# Patient Record
Sex: FEMALE | Race: WHITE
[De-identification: names, ages, dates, MRNs, and addresses within clinical notes are randomized per-mention and may not be internally consistent; named-entity substitution may affect disease eponyms.]

---

## 2018-09-23 ENCOUNTER — HOSPITAL ENCOUNTER (EMERGENCY)
Dept: HOSPITAL 97 - ER | Age: 58
Discharge: HOME | End: 2018-09-23
Payer: COMMERCIAL

## 2018-09-23 DIAGNOSIS — J01.90: Primary | ICD-10-CM

## 2018-09-23 DIAGNOSIS — Z88.7: ICD-10-CM

## 2018-09-23 PROCEDURE — 87081 CULTURE SCREEN ONLY: CPT

## 2018-09-23 PROCEDURE — 99283 EMERGENCY DEPT VISIT LOW MDM: CPT

## 2018-09-23 PROCEDURE — 87804 INFLUENZA ASSAY W/OPTIC: CPT

## 2018-09-23 PROCEDURE — 71046 X-RAY EXAM CHEST 2 VIEWS: CPT

## 2018-09-23 PROCEDURE — 87070 CULTURE OTHR SPECIMN AEROBIC: CPT

## 2018-09-23 NOTE — EDPHYS
Physician Documentation                                                                           

 [facility]                                                                

Name: Cornelia Waddell                                                                                

Age: 57 yrs                                                                                       

Sex: Female                                                                                       

: 1960                                                                                   

MRN: [id number]                                                                                   

Arrival Date: 2018                                                                          

Time: 12:18                                                                                       

Account#: [id number]                                                                            

Bed 23                                                                                            

Private MD: None, None                                                                            

ED Physician Sg Lyn                                                                      

HPI:                                                                                              

                                                                                             

13:54 This 57 yrs old  Female presents to ER via Ambulatory with complaints of       pm1 

      Productive Cough, Chest Congestion, Fever, No Voice.                                        

13:54 The patient or guardian reports cough, flu symptoms, low-grade fever, hoarse voice.     pm1 

      Onset: The symptoms/episode began/occurred 1 week(s) ago. Severity of symptoms: in the      

      emergency department the symptoms are actually worse. Modifying factors: The symptoms       

      are alleviated by nothing, the symptoms are aggravated by mold at work. Associated          

      signs and symptoms: Pertinent positives: sore throat, diarrhea from weight loss oil she     

      is taking, Pertinent negatives: nausea, vomiting. The patient has experienced similar       

      episodes in the past, a few times. The patient has not recently seen a physician.           

      Patient is a teacher, first grade, with multiple students with same symptoms .              

                                                                                                  

OB/GYN:                                                                                           

14:36 LMP N/A - Hysterectomy                                                                  kr2 

                                                                                                  

Historical:                                                                                       

- Allergies:                                                                                      

12:21 Tetanus-Diphtheria Toxoids-Td;                                                          la1 

- PMHx:                                                                                           

12:21 Arthritis; Hypothyroidism;                                                              la1 

- PSHx:                                                                                           

12:22 Cholecystectomy; Gastric Bypass; mesh sling; Hysterectomy;                              la1 

                                                                                                  

- Immunization history:: Adult Immunizations up to date.                                          

- Social history:: Smoking status: Patient/guardian denies using tobacco.                         

- Ebola Screening: : No symptoms or risks identified at this time.                                

                                                                                                  

                                                                                                  

ROS:                                                                                              

13:54 Eyes: Negative for injury, pain, redness, and discharge.                                pm1 

13:54 Neck: Negative for injury, pain, and swelling, Cardiovascular: Negative for chest pain,     

      palpitations, and edema.                                                                    

13:54 Abdomen/GI: Negative for abdominal pain, nausea, vomiting, diarrhea, and constipation,      

      Back: Negative for injury and pain, : Negative for injury, bleeding, discharge, and       

      swelling, MS/Extremity: Negative for injury and deformity, Skin: Negative for injury,       

      rash, and discoloration, Neuro: Negative for headache, weakness, numbness, tingling,        

      and seizure.                                                                                

13:54 Constitutional: Positive for body aches, fever, Negative for poor PO intake.                

13:54 ENT: Positive for sinus congestion, sore throat, Post nasal drainage, Negative for          

      drainage from ear(s), ear pain.                                                             

13:54 Respiratory: Positive for cough, Negative for shortness of breath, wheezing.                

                                                                                                  

Exam:                                                                                             

13:54 Constitutional:  This is a well developed, well nourished patient who is awake, alert,  pm1 

      and in no acute distress. Eyes:  Pupils equal round and reactive to light, extra-ocular     

      motions intact.  Lids and lashes normal.  Conjunctiva and sclera are non-icteric and        

      not injected.  Cornea within normal limits.  Periorbital areas with no swelling,            

      redness, or edema. ENT:  Nares patent. No nasal discharge, no septal abnormalities          

      noted.  Tympanic membranes are normal and external auditory canals are clear.               

      Oropharynx with no redness, swelling, or masses, exudates, or evidence of obstruction,      

      uvula midline.  Mucous membranes moist. Neck:  Trachea midline, no thyromegaly or           

      masses palpated, and no cervical lymphadenopathy.  Supple, full range of motion without     

      nuchal rigidity, or vertebral point tenderness.  No Meningismus. Chest/axilla:  Normal      

      chest wall appearance and motion.  Nontender with no deformity.  No lesions are             

      appreciated. Cardiovascular:  Regular rate and rhythm with a normal S1 and S2.  No          

      gallops, murmurs, or rubs.  Normal PMI, no JVD.  No pulse deficits. Respiratory:  Lungs     

      have equal breath sounds bilaterally, clear to auscultation and percussion.  No rales,      

      rhonchi or wheezes noted.  No increased work of breathing, no retractions or nasal          

      flaring. Abdomen/GI:  Soft, non-tender, with normal bowel sounds.  No distension or         

      tympany.  No guarding or rebound.  No evidence of tenderness throughout.                    

13:54 Back:  No spinal tenderness.  No costovertebral tenderness.  Full range of motion.          

      Skin:  Warm, dry with normal turgor.  Normal color with no rashes, no lesions, and no       

      evidence of cellulitis. MS/ Extremity:  Pulses equal, no cyanosis.  Neurovascular           

      intact.  Full, normal range of motion.                                                      

13:54 Head/face: Sinus tenderness, is located over the  right frontal sinus and left frontal      

      sinus.                                                                                      

13:54 Neuro: Orientation: is normal, Motor: moves all fours.                                      

                                                                                                  

Vital Signs:                                                                                      

12:21  / 81; Pulse 87; Resp 16; Temp 98.0; Pulse Ox 99% on R/A; Weight 86.18 kg; Height la1 

      5 ft. 9 in. (175.26 cm);                                                                    

14:31  / 82; Pulse 88; Resp 16; Pulse Ox 99% on R/A;                                    kr2 

12:21 Body Mass Index 28.06 (86.18 kg, 175.26 cm)                                             la1 

                                                                                                  

MDM:                                                                                              

13:04 Patient medically screened.                                                             pm1 

14:08 Data reviewed: vital signs. Data interpreted: Pulse oximetry: on room air is 99 %.      pm1 

      Interpretation: normal. Counseling: I had a detailed discussion with the patient and/or     

      guardian regarding: the historical points, exam findings, and any diagnostic results        

      supporting the discharge/admit diagnosis, lab results, radiology results, the need for      

      outpatient follow up, to return to the emergency department if symptoms worsen or           

      persist or if there are any questions or concerns that arise at home.                       

                                                                                                  

                                                                                             

13:16 Order name: Flu; Complete Time: 17:02                                                   pm1 

                                                                                             

13:16 Order name: Strep; Complete Time: 17:02                                                 pm1 

                                                                                             

13:16 Order name: Chest Pa And Lat (2 Views) XRAY; Complete Time: 13:54                       pm1 

                                                                                             

13:55 Order name: Throat Culture                                                              EDMS

                                                                                                  

Administered Medications:                                                                         

No medications were administered                                                                  

                                                                                                  

                                                                                                  

Disposition:                                                                                      

                                                                                             

07:06 Co-signature as Attending Physician, Sg Lyn MD I agree with the assessment and  linda 

      plan of care.                                                                               

                                                                                                  

Disposition:                                                                                      

18 14:09 Discharged to Home. Impression: Acute sinusitis.                                   

- Condition is Stable.                                                                            

- Discharge Instructions: Sinusitis, Adult.                                                       

- Prescriptions for Amoxicillin 500 mg Oral Capsule - take 1 capsule by ORAL route                

  every 8 hours for 10 days; 30 tablet. Zyrtec- D 5-120 mg Oral Tablet Sustained                  

  Release 12 hr - take 1 tablet by ORAL route every 12 hours As needed; 20 tablet.                

- Medication Reconciliation Form, Thank You Letter, Antibiotic Education, Prescription            

  Opioid Use form.                                                                                

- Follow up: Emergency Department; When: As needed; Reason: Worsening of condition.               

  Follow up: Private Physician; When: 2 - 3 days; Reason: Recheck today's complaints,             

  Continuance of care, Re-evaluation by your physician.                                           

- Problem is new.                                                                                 

- Symptoms have improved.                                                                         

                                                                                                  

                                                                                                  

                                                                                                  

Signatures:                                                                                       

Dispatcher [employer]                           EDSg Dobbs MD                     MD   linda                                                  

Attema, Kvng, RN                         RN   la1                                                  

Silvestre Son, NP                    NP   pm1                                                  

Lidia Ku, RN                       RN   kr2                                                  

                                                                                                  

Corrections: (The following items were deleted from the chart)                                    

                                                                                             

14:23 14:09 2018 14:09 Discharged to Home. Impression: Bronchitis, not specified as     pm1 

      acute or chronic. Condition is Stable. Forms are Medication Reconciliation Form, Thank      

      You Letter, Antibiotic Education, Prescription Opioid Use. Follow up: Emergency             

      Department; When: As needed; Reason: Worsening of condition. Follow up: Private             

      Physician; When: 2 - 3 days; Reason: Recheck today's complaints, Continuance of care,       

      Re-evaluation by your physician. Problem is new. Symptoms have improved. pm1                

14:35 13:18 Urine Dipstick-Ancillary ordered. pm1                                             kr2 

14:35 13:18 Urine Pregnancy Test ordered. pm1                                                 kr2 

14:36 14:23 2018 14:09 Discharged to Home. Impression: Acute sinusitis. Condition is    kr2 

      Stable. Discharge Instructions: Acute Bronchitis, Adult. Prescriptions for Zithromax        

      Z-Chato 250 mg Oral Tablet - take 1 tablet by ORAL route as directed for 5 days Day 1 -       

      take two (2) tablets one time. Day 2, 3, 4 , 5 take one (1) tablet once daily.; 6           

      tablet. and Forms are Medication Reconciliation Form, Thank You Letter, Antibiotic          

      Education, Prescription Opioid Use. Follow up: Emergency Department; When: As needed;       

      Reason: Worsening of condition. Follow up: Private Physician; When: 2 - 3 days; Reason:     

      Recheck today's complaints, Continuance of care, Re-evaluation by your physician.           

      Problem is new. Symptoms have improved. pm1                                                 

                                                                                                  

**************************************************************************************************

## 2018-09-23 NOTE — ER
Nurse's Notes                                                                                     

 [facility]                                                                

Name: Cornelia Waddell                                                                                

Age: 57 yrs                                                                                       

Sex: Female                                                                                       

: 1960                                                                                   

MRN: [id number]                                                                                   

Arrival Date: 2018                                                                          

Time: 12:18                                                                                       

Account#: [id number]                                                                            

Bed 23                                                                                            

Private MD: None, None                                                                            

Diagnosis: Acute sinusitis                                                                        

                                                                                                  

Presentation:                                                                                     

                                                                                             

12:20 Presenting complaint: Patient states: Productive cough for 3 days and I am losing my    la1 

      voice. Transition of care: patient was not received from another setting of care. Onset     

      of symptoms was 2018. Risk Assessment: Do you want to hurt yourself or        

      someone else? Patient reports no desire to harm self or others. Initial Sepsis Screen:      

      Does the patient meet any 2 criteria? No. Patient's initial sepsis screen is negative.      

      Does the patient have a suspected source of infection? No. Patient's initial sepsis         

      screen is negative. Care prior to arrival: None.                                            

12:20 Method Of Arrival: Ambulatory                                                           la1 

12:20 Acuity: SANG 3                                                                           la1 

                                                                                                  

Triage Assessment:                                                                                

13:49 General: Appears in no apparent distress. Respiratory: Onset: The symptoms/episode      kr2 

      began/occurred gradually, the patient has mild shortness of breath. Respiratory: Breath     

      sounds are clear bilaterally. Respiratory: Airway is patent Respiratory effort is even,     

      unlabored, Respiratory pattern is regular, symmetrical. Respiratory: Reports cough that     

      is productive, Onset: The symptoms/episode began/occurred.                                  

                                                                                                  

OB/GYN:                                                                                           

14:36 LMP N/A - Hysterectomy                                                                  kr2 

                                                                                                  

Historical:                                                                                       

- Allergies:                                                                                      

12:21 Tetanus-Diphtheria Toxoids-Td;                                                          la1 

- PMHx:                                                                                           

12:21 Arthritis; Hypothyroidism;                                                              la1 

- PSHx:                                                                                           

12:22 Cholecystectomy; Gastric Bypass; mesh sling; Hysterectomy;                              la1 

                                                                                                  

- Immunization history:: Adult Immunizations up to date.                                          

- Social history:: Smoking status: Patient/guardian denies using tobacco.                         

- Ebola Screening: : No symptoms or risks identified at this time.                                

                                                                                                  

                                                                                                  

Screenin:05 Abuse screen: Denies threats or abuse. Denies injuries from another. Nutritional        kr2 

      screening: No deficits noted. Tuberculosis screening: No symptoms or risk factors           

      identified. Fall Risk None identified.                                                      

                                                                                                  

Assessment:                                                                                       

13:05 General: Appears in no apparent distress. uncomfortable, well groomed, well developed,  kr2 

      well nourished, Behavior is calm, cooperative, appropriate for age. Pain: Complains of      

      pain in head, face, throat Pain does not radiate. Pain currently is 3 out of 10 on a        

      pain scale. Quality of pain is described as aching, tender, Is continuous, Alleviated       

      by nothing. Aggravated by eating, drinking. Neuro: Level of Consciousness is awake,         

      alert, obeys commands, Oriented to person, place, time, situation, Appropriate for age.     

      Cardiovascular: Rhythm is regular. Respiratory: Reports cough that is productive,           

      persistent Airway is patent Respiratory effort is even, unlabored, Breath sounds are        

      clear bilaterally. GI: Abdomen is flat, non-distended. EENT: Oral mucosa is moist.          

      Throat is pink. Derm: Skin is intact, is healthy with good turgor, Skin is pink, warm \T\   

      dry.                                                                                        

14:31 Reassessment: Patient appears in no apparent distress at this time. Patient and/or      kr2 

      family updated on plan of care and expected duration. Pain level reassessed. Patient is     

      alert, oriented x 3, equal unlabored respirations, skin warm/dry/pink.                      

                                                                                                  

Vital Signs:                                                                                      

12:21  / 81; Pulse 87; Resp 16; Temp 98.0; Pulse Ox 99% on R/A; Weight 86.18 kg; Height la1 

      5 ft. 9 in. (175.26 cm);                                                                    

14:31  / 82; Pulse 88; Resp 16; Pulse Ox 99% on R/A;                                    kr2 

12:21 Body Mass Index 28.06 (86.18 kg, 175.26 cm)                                             la1 

                                                                                                  

ED Course:                                                                                        

12:18 Patient arrived in ED.                                                                  sb2 

12:18 None, None is Private Physician.                                                        sb2 

12:21 Triage completed.                                                                       la1 

12:21 Arm band placed on right wrist.                                                         la1 

13:04 Silvestre Son NP is PHCP.                                                           pm1 

13:04 Sg Lyn MD is Attending Physician.                                             pm1 

13:05 Lidia Ku, IMER is Primary Nurse.                                                     kr2 

13:05 Patient has correct armband on for positive identification. Bed in low position. Call   kr2 

      light in reach. Side rails up X 1. Pulse ox on. NIBP on. Door closed. Warm blanket          

      given. Head of bed elevated.                                                                

13:28 Chest Pa And Lat (2 Views) XRAY In Process Unspecified.                                 EDMS

14:35 No provider procedures requiring assistance completed. Patient did not have IV access   kr2 

      during this emergency room visit.                                                           

                                                                                                  

Administered Medications:                                                                         

No medications were administered                                                                  

                                                                                                  

                                                                                                  

Outcome:                                                                                          

14:09 Discharge ordered by MD.                                                                pm1 

14:35 Discharged to home ambulatory.                                                          kr2 

14:35 Condition: good                                                                             

14:35 Discharge instructions given to patient, family, Instructed on discharge instructions,      

      follow up and referral plans. medication usage, Demonstrated understanding of               

      instructions, follow-up care, medications, Prescriptions given X 1.                         

14:36 Patient left the ED.                                                                    kr2 

                                                                                                  

Signatures:                                                                                       

Dispatcher [employer]                           EDMS                                                 

Kvng Baxter RN                         RN   la1                                                  

Silvestre Son, ANTONIO                    NP   pm1                                                  

Lidia Ku RN                       RN   kr2                                                  

Christin Anton                               sb2                                                  

                                                                                                  

**************************************************************************************************

## 2018-09-24 NOTE — XMS REPORT
Clinical Summary

 Created on:2018



Patient:Cornelia Chang

Sex:Female

:1960

External Reference #:[id number]





Demographics







 Address  [address]



   [place] [address]

 

 Mobile Phone  [phone]

 

 Home Phone  [phone]

 

 Work Phone  [phone]

 

 Email Address  [email or web address]

 

 Preferred Language  English

 

 Marital Status  

 

 Mosque Affiliation  Unknown

 

 Race  White

 

 Ethnic Group  Not  or 









Author







 Organization  [facility]

 

 Address  [address] [place] [address]









Support







 Name  Relationship  Address  Phone

 

 Yoni Chang  Spouse  [address]  [phone]



     [place] [address]  









Care Team Providers







 Name  Role  Phone

 

 Ban Mcelroy MD  Primary Care Provider  [phone]









Allergies

No Known Allergies



Current Medications







 Prescription  Sig.  Disp.  Refills  Start Date  End Date  Status

 

 LEVOXYL 50 mcg tablet  Take 50 mcg by    2  02/10/2017    Active



   mouth once daily.          

 

 ferrous sulfate 325 (65  Take 1 tablet by    3  2017    Active



 FE) MG tablet  mouth 2 (two) times          



   a day.          

 

 QSYMIA 7.5-46 mg capsule,  Take 1 capsule by    4  2017    Active



 ER multiphase 24 hr  mouth every          



   morning.          

 

 docusate sodium (COLACE)  Take 100 mg by    3  2017    Active



 100 MG capsule  mouth 2 (two) times          



   a day.          







Active Problems

Not on file



Family History







 Relation  Name  Status  Comments

 

 Father      

 

 Mother      

 

 Other  siblings  Alive  

 

 Other  sibling    







Social History







 Tobacco Use  Types  Packs/Day  Years Used  Date

 

 Never Smoker        









 Smokeless Tobacco: Never Used      









 Alcohol Use  Drinks/Week  oz/Week  Comments

 

 No      









 Sex Assigned at Birth  Date Recorded

 

 Not on file  







Last Filed Vital Signs

Not on file



Plan of Treatment







 Health Maintenance  Due Date  Last Done  Comments

 

 CERVICAL CANCER SCREENING  10/16/1983    

 

 BREAST CANCER SCREENING  10/16/2012    

 

 COLON CANCER SCREENING  10/16/2012    

 

 SHINGRIX VACCINE (#1)  10/16/2012    

 

 INFLUENZA VACCINE  2018    







Results

Not on fileafter 2017



Insurance







 Payer  Benefit Plan / Group  Subscriber ID  Type  Phone  Address

 

 [employer]  [employer]WORK  xxxxxxxxxxx  HMO    









 Guarantor Name  Account Type  Relation to  Date of Birth  Phone  Billing



     Patient      Address

 

 CORNELIA CHANG  Personal/Family  Self  1960  Work:  [address]



         [phone]  [place] [address]







         [address]



  [place]



         Home:  [id number]



         [phone]  



         [phone]

## 2018-09-24 NOTE — XMS REPORT
Continuity of Care Document

 Created on:2018



Patient:SHANAE CHANG

Sex:Female

:1960

External Reference #:[id number]





Demographics







 Address  [address] [place] [address]

 

 Phone  [phone]

 

 Phone  [phone]

 

 Preferred Language  Unknown

 

 Marital Status  Unknown

 

 Zoroastrianism Affiliation  Unknown

 

 Race  Unknown

 

 Ethnic Group  Unknown









Author







 Organization  [facility]









Problems







 Problem  Status  Onset  Classification  Date  Comments  Source



     Date    Reported    



             



             



             

 

 LABS-FOLLOW UP  Active  20        [address]



             



             

 

 ANEMIA  Active  10/21/20        [address]



             



             

 

 H/O: anemia -  Active  10/11/20  Problem  2018  Data migrated  [facility]



 iron    13      from   Medical



 deficient<sup>3,          [facility]  [place],



 4</sup>          6/3/15.  Medical



             Group



             



             

 

 H/O: anemia -  Active  10/11/20  Problem  2017  Data migrated  [facility]



 iron    13      from   Medical



 deficient<sup>3,          [facility]  [place],



 4</sup>          6/3/15.  [facility]



             [place]



             



             

 

 Fatigue<sup>1,  Active  20  Problem  2018  Data migrated  [facility]



 2</sup>    13      from [facility]



           [facility]  [facility],



           6/3/15.  Medical



             Group



             



             

 

 Fatigue<sup>1,  Active  20  Problem  2017  Data migrated  [facility]



 2</sup>    13      from [facility]



           [facility]  [place],



           6/3/15.  [facility]



             [place]



             



             

 

 Other  Active    Problem  2018     Medical



 constipation            Group



             



             

 

 Chronic  Active    Problem  2018     Medical



 thyroiditis            Group,



             [facility]



             [place]



             



             

 

 Low serum  Active    Problem  2018     Medical



 vitamin D            Group



             



             

 

 Body mass index  Active    Problem  2018     Medical



 32.0-32.9,            Group



 adult(<span            



 ID="[id number]            



 ">Confirmed</spa            



 n>)            



             

 

 History of  Active    Problem  2018     Medical



 gastric bypass            Group



             



             

 

 Hypothyroid  Active    Problem  2018    [facility],



             Medical



             Group



             



             

 

 Leukopenia  Active    Problem  2018    [facility]



             Medical



             Group



             



             

 

 Obesity<sup>5,  Active    Problem  2018  Data migrated  [facility]



 6</sup>          from [facility]



           [facility]  [place],



           6/3/15.  Medical



             Group



             



             

 

 Hypothyroid  Active    Problem  2017    [facility]



             [facility]



             



             

 

 Leukopenia  Active    Problem  2017    [facility],



             [place] [facility]



             



             

 

 Obesity<sup>5,  Active    Problem  2017  Data migrated  [facility]



 6</sup>          from [facility],



           6/3/15.  [facility]



             



             

 

 Heberden's node  Active    Problem  2018     Medical



             Group



             



             







Medications







 Medication  Details  Route  Status  Patient  Ordering  Order  Source



         Instructions  Provider  Date  



               



               



               

 

 plecanatide 3 MG  3 mg=1 tab,    Active        



 Oral Tablet  PO, Daily, #          018  Medical



 [Trulance]  1 box, 0            Group



   Refill(s),            



   given to            



   patient            



               



               

 

 Levothyroxine  See    Active        



 Sodium 0.05 MG  Instructions          018  Medical



 Oral Tablet  , TAKE 1            Group



 [Levoxyl]  TABLET BY            



   MOUTH EVERY            



   DAY, # 90            



   tab, 1            



   Refill(s),            



   Pharmacy:            



   [facility]/pharmacy            



   #[id number]            



               



               

 

 Ascorbic Acid  1 tab, PO,    Active        



 120 MG /  Daily, # 30          018  Medical



 Docusate Sodium  tab, 5            Group



 50 MG / Folic  Refill(s),            



 Acid 1 MG / Iron  Pharmacy:            



 Carbonyl 90 MG /  [facility]/pharmacy            



 Vitamin B 12  #[id number]            



 0.012 MG Oral              



 Tablet [Ferralet              



 90]              



               

 

 24 HR  1 cap, PO,    Active        



 Phentermine 11.3  QAM, # 30          018  Medical



 MG / topiramate  cap, 3            Group



 69 MG Extended  Refill(s)            



 Release Capsule              



 [Qsymia              



 11.25/69]              



               

 

 24 HR  1 cap, PO,    Active        



 Phentermine 11.3  QAM, # 30          018  Medical



 MG / topiramate  cap, 3            Group



 69 MG Extended  Refill(s)            



 Release Capsule              



 [Qsymia              



 11.25/69]              



               

 

 24 HR  1 cap, PO,    Active        



 Phentermine 11.3  QAM, # 30          018  Medical



 MG / topiramate  cap, 3            Group



 69 MG Extended  Refill(s)            



 Release Capsule              



 [Qsymia              



 11.25/69]              



               

 

 plecanatide 3 MG  3 mg=1 tab,    Active        



 Oral Tablet  PO, Daily, #          018  Medical



 [Trulance]  14 tab, 0            Group



   Refill(s),            



   given to            



   patient            



               



               

 

 Ascorbic Acid  1 tab, PO,    Active        



 120 MG /  Daily, # 30          018  Medical



 Docusate Sodium  tab, 4            Group



 50 MG / Folic  Refill(s),            



 Acid 1 MG / Iron  Pharmacy:            



 [facility]yl 90 [facility]            



 Vitamin B 12  #[id number]            



 0.012 MG Oral              



 Tablet [Ferralet              



 90]              



               

 

 plecanatide 3 MG  3 mg=1 tab,    Active        



 Oral Tablet  PO, Daily, #          018  Medical



 [Trulance]  30 tab, 5            Group



   Refill(s),            



   [facility]arm[facility]            



   [facility]            



   [id number]            



               



               

 

 Docusate Sodium  100 mg=1    Active         [place]



 100 MG Oral  cap, PO,          017  Medical



 Capsule [Colace]  BID, # 60            Center



   cap, 3            



   Refill(s),            



   [facility]arm[facility]            



   [facility]            



   [id number]            



               



               

 

 ferrous sulfate  325 mg=1    Active        [facility]



 325 MG Oral  tab, PO,          017  Medical



 Tablet  BID, # 90            Center



   tab, 3            



   Refill(s),            



   Pharmacy:            



   [facility]            



   #[id number]            



               



               

 

 Tylenol  650 mg, 2    Inactive        [facility]



   tab, Route:          017  Medical



   PO, Drug            Center



   form: TAB,            



   On Adm,            



   Start date:            



   17            



   7:00:00 CST,            



   Duration: 1            



   doses or            



   times, Stop            



   date:            



   17            



   21:00:00            



   CSTNotes: Do            



   not exceed 4            



   gm/day.            



   (Same as:            



   Tylenol)            



               



               

 

 ferric  750 mg, 15    Inactive        [facility] [place]



 carboxymaltose +  mL, Route:          017  Medical



 sodium chloride  IVPB, Drug            Center



 0.9%  mL  form: SOLN,            



   On Adm,            



   Start date:            



   17            



   7:00:00 CST,            



   Duration: 1            



   doses or            



   times, Stop            



   date:            



   17            



   21:00:00            



   CSTNotes:            



   (Same as:            



   Injectafer)            



   Non-formular            



   y            



   Administer            



   as slow I.V.            



   push            



   (undiluted)            



   at a rate of            



   100            



   mg/minute or            



   by I.V.            



   infusion            



   (diluted to            



   2 mg/mL)            



   over at            



   least 15            



   minutes            



               



               

 

 Benadryl  25 mg, 1    Inactive        [facility]



   cap, Route:          017  Medical



   PO, Drug            Center



   form: CAP,            



   On Adm,            



   Start date:            



   17            



   7:00:00 CST,            



   Duration: 1            



   doses or            



   times, Stop            



   date:            



   17            



   21:00:00            



   CSTNotes:            



   (Same as:            



   Benadryl)            



               



               

 

 ferric  750 mg, 15    Inactive        [facility] [place]



 carboxymaltose +  mL, Route:          [facility]  Medical



 sodium chloride  IVP, Drug            Center



 0.9%  mL  form: SOLN,            



   On Adm,            



   Start date:            



   16            



   7:00:00 CST,            



   Duration: 1            



   doses or            



   times, Stop            



   date:            



   16            



   21:00:00            



   CSTNotes:            



   (Same as:            



   Injectafer)            



   Non-formular            



   y            



   Administer            



   as slow I.V.            



   push            



   (undiluted)            



   at a rate of            



   100            



   mg/minute or            



   by I.V.            



   infusion            



   (diluted to            



   2 mg/mL)            



   over at            



   least 15            



   minutes            



               



               

 

 Benadryl  25 mg, 1    Inactive        [facility]



   cap, Route:          016  Medical



   PO, Drug            Center



   form: CAP,            



   On Adm,            



   Start date:            



   16            



   7:00:00 CST,            



   Duration: 1            



   doses or            



   times, Stop            



   date:            



   16            



   21:00:00            



   CSTNotes:            



   (Same as:            



   Benadryl)            



               



               

 

 Tylenol  650 mg, 2    Inactive        [facility]



   tab, Route:          016  Medical



   PO, Drug            Center



   form: TAB,            



   On Adm,            



   Start date:            



   16            



   7:00:00 CST,            



   Duration: 1            



   doses or            



   times, Stop            



   date:            



   16            



   21:00:00            



   CSTNotes: Do            



   not exceed 4            



   gm/day.            



   (Same as:            



   Tylenol)            



               



               







Allergies, Adverse Reactions, Alerts







 Substance  Category  Reaction  Severity  Reaction  Status  Date  Comments  
Source



         type    Reported    



                 



                 



                 

 

 NKDA  Assertion      Drug  Active      



         allergy        Medical



                 Group



                 



                 







Immunizations







 Immunization  Date Given  Site  Status  Last Updated  Comments  Source



             



             







Results







 Order  Results  Value  Reference  Date  Interpretation  Comments  Source



 Name      Range        



               



               



               

 

 Ext  Ext  Patient Name: SHANAE ROSALES CHANG        -   [facility]



 Lower  Lower      /    -  [place]



 Venous  Venous  : 1960; Age: 56 years  y/o Female          



 Doppler  Doppler            



 Unilat  Unilat  MR: [id number]        Read by:  Yoel Harman MD  



 [facility]          Dictated Date/time:  17 16:37  



             Electronically Signed by:  Yoel Harman MD           
   17 16:38  



             FINAL REPORT  



     Study: Ext Lower Venous Doppler Unilat  2017 3:21 PM CDT          



               



     Ordering Physician:          



               



               



               



     Clinical Indication: Left foot swelling          



               



     Comparison: None          



               



               



               



     Left lower extremity venous Doppler ultrasound exam demonstrates normal 
flow and compressibility of common femoral, superficial femoral, and popliteal 
venous segments. Normal distal augmentation.          



               



               



               



     IMPRESSION: No evidence for left lower extremity deep vein thrombosis.    
      



               



               



               



               



               



               



               



               



               



     SL:  [id number]          



               



               



               



               







Vital Signs







 Vital Sign  Value  Date  Comments  Source



         

 

 BMI Calculated  34.13  2018     Medical Group



         



         

 

 Height  172.72 cm  2018     [facility]



         



         

 

 Weight  101.818  2018     Medical Group



         



         

 

 Heart Rate  78  2018     Medical Group



         



         

 

 Systolic (mm Hg)  117  2018     Medical Group



         



         

 

 Diastolic (mm Hg)  80  2018    [facility]



         



         

 

 BMI Calculated  33.22  2018     [facility]



         



         

 

 Height  172.72 cm  2018     [facility]



         



         

 

 Weight  99.091  2018     Medical Group



         



         

 

 Systolic (mm Hg)  117  2018     Medical Group



         



         

 

 Diastolic (mm Hg)  82  2018     [facility]



         



         

 

 Heart Rate  91  2018    [facility]



         



         

 

 BMI Calculated  33.62  2017    [facility]



         

 

 Height  172 cm  2017    [facility]



         

 

 Weight  99.455  2017    [facility]



         

 

 Temperature Oral (F)  97.8 F  2017    [facility]



         

 

 Respitory Rate  16  2017    [facility]



         

 

 Heart Rate  66  2017    [facility]



         

 

 Systolic (mm Hg)  114  2017    [facility]



         

 

 Diastolic (mm Hg)  79  2017    [facility]



         

 

 Systolic (mm Hg)  129  2017    [facility]



         

 

 Diastolic (mm Hg)  78  2017    [facility]



         

 

 Heart Rate  97  2017    [facility]



         

 

 Respitory Rate  18  2017    [facility]



         

 

 Temperature Oral (F)  98.1 F  2017    [facility]



         

 

 Weight  101.182  2017    [facility]



         

 

 Height  172 cm  2017    [facility]



         

 

 BMI Calculated  34.2  2017    [facility]



         

 

 Weight  101.182  2016    [facility]



         

 

 BMI Calculated  33.92  2016    [facility]



         

 

 Height  172.72 cm  2016    [facility]



         

 

 Temperature Oral (F)  98.9 F  2016    [facility]



         

 

 Respitory Rate  16  2016    [facility]



         

 

 Heart Rate  89  2016    [facility]



         

 

 Systolic (mm Hg)  126  2016    [facility]



         

 

 Diastolic (mm Hg)  74  2016    [facility]



         

 

 Height  171 cm  2016    [facility]



         

 

 BMI Calculated  34.7  2016    [facility]



         

 

 Weight  101.455  2016    [facility]



         

 

 Temperature Oral (F)  98.2 F  2016    [facility]



         

 

 Respitory Rate  18  2016    [facility]



         

 

 Heart Rate  77  2016    [facility]



         

 

 Systolic (mm Hg)  113  2016    [facility]



         

 

 Diastolic (mm Hg)  76  2016    [facility]



         







Encounters







 Location  Location  Encounter  Encounter  Reason  Attending  ADM  DC  Status  
Source



   Details  Type  Number  For  Provider  Date  Date    



         Visit          



                   



                   



                   

 

     Outpatient  [id number]    NICK  08/10    [facility]



                 [place]



                   



                   



                   



                   

 

     Outpatient  [id number]        [facility]



                 [place]



                   



                   



                   



                   

 

     Outpatient  [id number]    NICK  10/12    [facility]



                 [facility]    Recurring  [id number]    Dee      [facility]          [place]  /2016    Medical



 Oncology                  Critical access hospital                  



                   



                   

 

 [facility]    Recurring  [id number]    Dee      [facility]          [place]  /2017    Medical



 Oncology                  Center



 [facility]                  



                   



                   

 

     Outpatient  [id number]        [facility]      [facility]    Outpt Diag  [id number]         [facility]



 Outpatient    Services      Shi  /2017    [place]



 Imaging                  



 [facility] [place]                  



                   



                   

 

     Outpatient  [id number]    NICK  10/09    [facility]



                 [facility]    Phone  [phone]          



 Internal    Message        /2018    Medical



 Medicine                  Group



 [facility]                  



                   



                   

 

     Outpatient  [id number]        [facility]    [facility]  [id number]    Nick  01/09  01/10    



 Internal          [place]  /2018    Medical



 Medicine                  Group



 [facility]    Phone  [phone]          



 Internal    Message        /2018    Medical



 Medicine                  Group



 [facility]    Phone  [phone]          



 Internal    Message        /2018    Medical



 Medicine                  Group



 [facility]                  



                   



                   

 

 [facility]    Phone  [phone]          



 Internal    Message        /2018    Medical



 Medicine                  Group



 [facility]    Phone  [phone]          



 Internal    Message        /2018    Medical



 Medicine                  Group



 [facility]                  



                   



                   

 

     Outpatient  [id number]        [facility]      [place]



                   



                   



                   



                   

 

 [facility]    Outpatient  [id number]    Nick    



 Internal          [place]  /2018    Medical



 Medicine                  Group



 [facility]    Phone  [phone]          



 Internal    Message        /2018    Medical



 Medicine                  Group



 [facility]                  



                   



                   

 

     Outpatient  [id number]        [facility]      [place]



                   



                   



                   



                   







Procedures







 Procedure  Code  Date  Perfomer  Comments  Source



           



           

 

 Cholecystectomy  [id number]        [facility]



           

 

 Gastric bypass  [id number]        [facility]



           

 

 Hysterotomy  [id number]        [facility]



           

 

 Operation  [id number]        [facility]



           

 

 Cholecystectomy  [id number]         Medical



           Group



           

 

 Gastric bypass  [id number]         Medical



           Group



           

 

 Hysterotomy  [id number]         Medical



           Group



           

 

 Operation  [id number]         Medical



           Group



           

 

 Cholecystectomy  [id number]         [facility]



           [place]



           

 

 Gastric bypass  [id number]         [facility]



           [place]



           

 

 Hysterotomy  [id number]         [facility]



           [place]



           

 

 Operation  [id number]         [facility]



           [place]

## 2019-02-08 NOTE — RAD REPORT
EXAM DESCRIPTION:  RAD - Chest Pa And Lat (2 Views) - 9/23/2018 1:31 pm

 

CLINICAL HISTORY:  COUGH

Chest pain.

 

COMPARISON:  Chest Single View dated 9/12/2017

 

FINDINGS:  The lungs are clear. The heart is normal in size. No displaced fractures.

 

IMPRESSION:  No acute or concerning finding suspected. DISPLAY PLAN FREE TEXT